# Patient Record
Sex: MALE | Race: OTHER | NOT HISPANIC OR LATINO | URBAN - METROPOLITAN AREA
[De-identification: names, ages, dates, MRNs, and addresses within clinical notes are randomized per-mention and may not be internally consistent; named-entity substitution may affect disease eponyms.]

---

## 2023-06-30 ENCOUNTER — OFFICE VISIT (OUTPATIENT)
Dept: URGENT CARE | Facility: CLINIC | Age: 23
End: 2023-06-30
Payer: COMMERCIAL

## 2023-06-30 VITALS
OXYGEN SATURATION: 98 % | RESPIRATION RATE: 18 BRPM | DIASTOLIC BLOOD PRESSURE: 69 MMHG | SYSTOLIC BLOOD PRESSURE: 127 MMHG | HEART RATE: 80 BPM | WEIGHT: 165 LBS | HEIGHT: 71 IN | BODY MASS INDEX: 23.1 KG/M2 | TEMPERATURE: 98 F

## 2023-06-30 DIAGNOSIS — U07.1 COVID-19: Primary | ICD-10-CM

## 2023-06-30 DIAGNOSIS — J02.9 SORE THROAT: ICD-10-CM

## 2023-06-30 LAB
CTP QC/QA: YES
CTP QC/QA: YES
S PYO RRNA THROAT QL PROBE: NEGATIVE
SARS-COV-2 AG RESP QL IA.RAPID: POSITIVE

## 2023-06-30 PROCEDURE — 99203 PR OFFICE/OUTPT VISIT, NEW, LEVL III, 30-44 MIN: ICD-10-PCS | Mod: S$GLB,,, | Performed by: EMERGENCY MEDICINE

## 2023-06-30 PROCEDURE — 87811 SARS CORONAVIRUS 2 ANTIGEN POCT, MANUAL READ: ICD-10-PCS | Mod: QW,S$GLB,, | Performed by: EMERGENCY MEDICINE

## 2023-06-30 PROCEDURE — 87880 POCT RAPID STREP A: ICD-10-PCS | Mod: QW,S$GLB,, | Performed by: EMERGENCY MEDICINE

## 2023-06-30 PROCEDURE — 87811 SARS-COV-2 COVID19 W/OPTIC: CPT | Mod: QW,S$GLB,, | Performed by: EMERGENCY MEDICINE

## 2023-06-30 PROCEDURE — 99203 OFFICE O/P NEW LOW 30 MIN: CPT | Mod: S$GLB,,, | Performed by: EMERGENCY MEDICINE

## 2023-06-30 PROCEDURE — 87880 STREP A ASSAY W/OPTIC: CPT | Mod: QW,S$GLB,, | Performed by: EMERGENCY MEDICINE

## 2023-06-30 NOTE — PROGRESS NOTES
"Subjective:      Patient ID: Mu Boone is a 22 y.o. male.    Vitals:  height is 5' 11" (1.803 m) and weight is 74.8 kg (165 lb). His temperature is 98.3 °F (36.8 °C). His blood pressure is 127/69 and his pulse is 80. His respiration is 18 and oxygen saturation is 98%.     Chief Complaint: Sore Throat    Patient presents with complaint of sore throat starting yesterday.   Body aches headaches  stuffy ears    Sore Throat   This is a new problem. The current episode started yesterday. The problem has been unchanged. The pain is worse on the right side. There has been no fever. The pain is at a severity of 4/10. The pain is mild. Associated symptoms include headaches, a plugged ear sensation and swollen glands. Pertinent negatives include no abdominal pain, congestion, coughing, diarrhea, drooling, ear discharge, ear pain, hoarse voice, neck pain, shortness of breath, stridor, trouble swallowing or vomiting. He has had no exposure to strep or mono. Treatments tried: cetrazine. The treatment provided no relief.     HENT:  Positive for sore throat. Negative for ear pain, ear discharge, drooling, congestion and trouble swallowing.    Neck: Negative for neck pain.   Respiratory:  Negative for cough, shortness of breath and stridor.    Gastrointestinal:  Negative for abdominal pain, vomiting and diarrhea.   Neurological:  Positive for headaches.    Objective:     Physical Exam    Assessment:     1. Sore throat        Plan:       Sore throat  -     POCT rapid strep A                    "

## 2023-06-30 NOTE — LETTER
[id number] , [address]  [facility] [address]  Phone: [phone]  Fax: [phone]          Return to Work/School    Patient: Mu Boone  YOB: 2000   Date: 06/30/2023     To Whom It May Concern:     Mu Boone was in contact with/seen in my office on 06/30/2023. COVID-19 is present in our communities across the [facility]. There is limited testing for COVID at this time, so not all patients can be tested. In this situation, your employee meets the following criteria:     Mu Boone has met the criteria for COVID-19 testing and has a POSITIVE result. He can return to work once they are asymptomatic for 24 hours without the use of fever reducing medications AND at least five days from the start of symptoms (or from the first positive result if they have no symptoms). May return on 07/05/2023 but then in a mask for 5 days.        If you have any questions or concerns, or if I can be of further assistance, please do not hesitate to contact me.     Sincerely,      Karissa Almanza MD

## 2023-06-30 NOTE — PATIENT INSTRUCTIONS
Use over the counter(OTC) antihistamine like claritin or zyrtec daily.  Nasal saline drops: 2-4 drops per nostril 10-15 times a day.   Tylenol or Motrin for fever or pain per label instructions.  Consider use of OTC cough medicine like Robitussin DM.  Warm saltwater gargles to 3 times a day.  Rest and drink plenty of fluids.  Avoid OTC decongestants if you have high blood pressure. This includes multi-cold symptom preparations.    Follow up in 2-3 days with PCP if no improvement or any worsening.       POSITIVE COVID TEST  You have tested positive for COVID-19 today.  Please note that patients who test positive for COVID-19 are required by the CDC to undergo isolation for 10 days after their symptoms first began.  This isolation starts from the day you first developed symptoms, not the day of your positive test. For example, if your symptoms began on a Monday but tested positive on the following Wednesday, your 10-day isolation begins from that Monday, not the Wednesday you tested positive.  However, if you are asymptomatic (a person who does not have any symptoms) and COVID-19 positive, your 10-day isolation begins on the day you tested positive, regardless of exposure date.  Also, per the CDC guidelines, once your 10 days have passed, and you have not had fever greater than 100.4F in the last 24 hours without taking any fever reducers such as Tylenol (Acetaminophen) or Motrin (Ibuprofen), you may return to your normal activities including social distancing, wearing masks, and frequent handwashing - YOU DO NOT NEED ANOTHER TEST IN ORDER TO END YOUR QUARANTINE.

## 2023-06-30 NOTE — PROGRESS NOTES
"Subjective:      Patient ID: Mu Boone is a 22 y.o. male.    Vitals:  height is 5' 11" (1.803 m) and weight is 74.8 kg (165 lb). His temperature is 98.3 °F (36.8 °C). His blood pressure is 127/69 and his pulse is 80. His respiration is 18 and oxygen saturation is 98%.     Chief Complaint: Sore Throat    Patient presents with complaint of sore throat, body aches, headaches, ear fullness starting yesterday.     Sore Throat   This is a new problem. The current episode started yesterday. The problem has been unchanged. The pain is worse on the right side. There has been no fever. The pain is at a severity of 4/10. The pain is mild. Associated symptoms include headaches, a plugged ear sensation and swollen glands. Pertinent negatives include no abdominal pain, congestion, coughing, diarrhea, drooling, ear discharge, ear pain, hoarse voice, neck pain, shortness of breath, stridor, trouble swallowing or vomiting. He has had no exposure to strep or mono. Treatments tried: cetrazine. The treatment provided no relief.     HENT:  Positive for sore throat. Negative for ear pain, ear discharge, drooling, congestion and trouble swallowing.    Neck: Negative for neck pain.   Respiratory:  Negative for cough, shortness of breath and stridor.    Gastrointestinal:  Negative for abdominal pain, vomiting and diarrhea.   Neurological:  Positive for headaches.    Objective:     Physical Exam   Constitutional: He is oriented to person, place, and time.  Non-toxic appearance. He does not appear ill. He appears distressed. He is not intubated.   HENT:   Head: Normocephalic and atraumatic.   Ears:   Right Ear: Tympanic membrane, external ear and ear canal normal.   Left Ear: Tympanic membrane, external ear and ear canal normal.   Nose: Nose normal.   Mouth/Throat: Mucous membranes are moist. Posterior oropharyngeal erythema present. No oropharyngeal exudate.   Eyes: Conjunctivae are normal. No scleral icterus. Extraocular movement " intact   Neck: Neck supple.   Cardiovascular: Normal rate, regular rhythm, normal heart sounds and normal pulses.   Pulmonary/Chest: Effort normal and breath sounds normal. No accessory muscle usage or stridor. No apnea, no tachypnea and no bradypnea. He is not intubated. No respiratory distress. He has no decreased breath sounds. He has no wheezes. He has no rhonchi. He has no rales.   Abdominal: Normal appearance.   Neurological: He is alert and oriented to person, place, and time.   Skin: Skin is warm and diaphoretic.   Nursing note and vitals reviewed.    Assessment:     1. COVID-19    2. Sore throat    1 covid risk score  Results for orders placed or performed in visit on 06/30/23   POCT rapid strep A   Result Value Ref Range    Rapid Strep A Screen Negative Negative     Acceptable Yes    SARS Coronavirus 2 Antigen, POCT Manual Read   Result Value Ref Range    SARS Coronavirus 2 Antigen Positive (A) Negative     Acceptable Yes          Plan:       COVID-19    Sore throat  -     POCT rapid strep A  -     SARS Coronavirus 2 Antigen, POCT Manual Read          Medical Decision Making:   Initial Assessment:   Pt presents with body aches, sore throat, headache, feverish since yesterday. Pt denies taking anything for pain. Also denies known exposure to flu or covid.   Differential Diagnosis:   COVID-19, influenza, other viral illness. Will order covid to r/o.   Clinical Tests:   Lab Tests: Ordered and Reviewed       <> Summary of Lab: COVID-19 test positive.  Urgent Care Management:  Offered patient Paxlovid since his COVID risk score is 1 due to being a male. Pt states he felt he did not need this.   Educated on symptomatic treatment with nasal steroid, nasal saline rinse to help drain mucus, and to take otc antihistamine. Tylenol/ibuprofen for body aches, sore throat, and fever per label instructions. Instructed patient to drink plenty fluids to ensure hydration.     Also educated on  isolation for 5 days since symptom onset.

## 2023-07-03 ENCOUNTER — TELEPHONE (OUTPATIENT)
Dept: URGENT CARE | Facility: CLINIC | Age: 23
End: 2023-07-03
Payer: COMMERCIAL